# Patient Record
Sex: FEMALE | HISPANIC OR LATINO | URBAN - NONMETROPOLITAN AREA
[De-identification: names, ages, dates, MRNs, and addresses within clinical notes are randomized per-mention and may not be internally consistent; named-entity substitution may affect disease eponyms.]

---

## 2020-08-05 ENCOUNTER — OFFICE VISIT (OUTPATIENT)
Dept: URBAN - NONMETROPOLITAN AREA CLINIC 18 | Facility: CLINIC | Age: 60
End: 2020-08-05
Payer: COMMERCIAL

## 2020-08-05 DIAGNOSIS — H01.8 OTHER SPECIFIED INFLAMMATIONS OF EYELID: ICD-10-CM

## 2020-08-05 DIAGNOSIS — H52.03 HYPERMETROPIA, BILATERAL: ICD-10-CM

## 2020-08-05 DIAGNOSIS — H25.13 AGE-RELATED NUCLEAR CATARACT, BILATERAL: Primary | ICD-10-CM

## 2020-08-05 DIAGNOSIS — H43.21 CRYSTALLINE DEPOSITS IN VITREOUS BODY, RIGHT EYE: ICD-10-CM

## 2020-08-05 PROCEDURE — 92004 COMPRE OPH EXAM NEW PT 1/>: CPT | Performed by: OPTOMETRIST

## 2020-08-05 PROCEDURE — 99204 OFFICE O/P NEW MOD 45 MIN: CPT | Performed by: OPTOMETRIST

## 2020-08-05 ASSESSMENT — KERATOMETRY
OS: 44.75
OD: 45.00

## 2020-08-05 ASSESSMENT — INTRAOCULAR PRESSURE
OS: 18
OD: 18

## 2020-08-05 ASSESSMENT — VISUAL ACUITY
OD: 20/25
OS: 20/20

## 2020-08-05 NOTE — IMPRESSION/PLAN
Impression: Crystalline deposits in vitreous body, right eye: H43.21. Plan: All signs/symptoms and risks of retinal detachment and tears were discussed in detail. Patient instructed to call office immediately if any symptoms noted.

## 2022-09-07 ENCOUNTER — OFFICE VISIT (OUTPATIENT)
Dept: URBAN - NONMETROPOLITAN AREA CLINIC 18 | Facility: CLINIC | Age: 62
End: 2022-09-07
Payer: MEDICARE

## 2022-09-07 DIAGNOSIS — H43.21 CRYSTALLINE DEPOSITS IN VITREOUS BODY, RIGHT EYE: ICD-10-CM

## 2022-09-07 DIAGNOSIS — H18.522 EPITHELIAL (JUVENILE) CORNEAL DYSTROPHY, LEFT EYE: ICD-10-CM

## 2022-09-07 DIAGNOSIS — H25.811 COMBINED FORMS OF AGE-RELATED CATARACT, RIGHT EYE: Primary | ICD-10-CM

## 2022-09-07 DIAGNOSIS — H25.12 AGE-RELATED NUCLEAR CATARACT, LEFT EYE: ICD-10-CM

## 2022-09-07 DIAGNOSIS — H04.123 DRY EYE SYNDROME OF BILATERAL LACRIMAL GLANDS: ICD-10-CM

## 2022-09-07 PROCEDURE — 99213 OFFICE O/P EST LOW 20 MIN: CPT | Performed by: OPTOMETRIST

## 2022-09-07 ASSESSMENT — VISUAL ACUITY
OS: 20/20
OD: 20/30

## 2022-09-07 ASSESSMENT — INTRAOCULAR PRESSURE
OD: 18
OS: 18

## 2022-09-07 NOTE — IMPRESSION/PLAN
Impression: Crystalline deposits in vitreous body, right eye: H43.21. Plan: Discussed status. All signs/symptoms and risks of retinal detachment and tears were discussed in detail. Patient instructed to call office immediately if any symptoms noted.

## 2022-09-07 NOTE — IMPRESSION/PLAN
Impression: Combined forms of age-related cataract, right eye: H25.811. Plan: Reviewed ocular status and cataract with patient. Consider CE if no improvement in vision after ocular surface treatment. Patient will monitor for any decrease in vision and notify clinic if new symptoms experienced.

## 2022-11-10 ENCOUNTER — OFFICE VISIT (OUTPATIENT)
Dept: URBAN - NONMETROPOLITAN AREA CLINIC 18 | Facility: CLINIC | Age: 62
End: 2022-11-10
Payer: MEDICARE

## 2022-11-10 DIAGNOSIS — H52.03 HYPERMETROPIA, BILATERAL: ICD-10-CM

## 2022-11-10 DIAGNOSIS — H04.123 DRY EYE SYNDROME OF BILATERAL LACRIMAL GLANDS: Primary | ICD-10-CM

## 2022-11-10 DIAGNOSIS — H47.293 OTHER OPTIC ATROPHY, BILATERAL: ICD-10-CM

## 2022-11-10 DIAGNOSIS — H25.12 AGE-RELATED NUCLEAR CATARACT, LEFT EYE: ICD-10-CM

## 2022-11-10 DIAGNOSIS — H25.811 COMBINED FORMS OF AGE-RELATED CATARACT, RIGHT EYE: ICD-10-CM

## 2022-11-10 PROCEDURE — 99213 OFFICE O/P EST LOW 20 MIN: CPT | Performed by: OPTOMETRIST

## 2022-11-10 PROCEDURE — 92133 CPTRZD OPH DX IMG PST SGM ON: CPT | Performed by: OPTOMETRIST

## 2022-11-10 ASSESSMENT — INTRAOCULAR PRESSURE
OS: 19
OD: 21

## 2022-11-10 ASSESSMENT — VISUAL ACUITY
OD: 20/25
OS: 20/25

## 2022-11-10 NOTE — IMPRESSION/PLAN
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 Plan: Discussed status with patient. OCT ON today, borderline thin OU S/P papilledema OU. OCT macula today, thin OU. H/O treatment with lumbar puncture and had diagnosis for Lupus. Potential mild atrophy of Optic nerve from prior history, will monitor for stability. Optic nerve margins appear normal at this time with no new symptoms. Will monitor in 6 months with OCT ON/VF and follow up.

## 2022-11-10 NOTE — IMPRESSION/PLAN
Impression: Dry eye syndrome of bilateral lacrimal glands: H04.123. Plan: Discussed condition with patient in detail. Improved symptoms after artificial tears. Recommend treatment with Preservative Free Artificial tears QID. RTC if symptoms continue.

## 2023-07-25 ENCOUNTER — OFFICE VISIT (OUTPATIENT)
Dept: URBAN - NONMETROPOLITAN AREA CLINIC 18 | Facility: CLINIC | Age: 63
End: 2023-07-25
Payer: MEDICARE

## 2023-07-25 DIAGNOSIS — H25.12 AGE-RELATED NUCLEAR CATARACT, LEFT EYE: ICD-10-CM

## 2023-07-25 DIAGNOSIS — H25.811 COMBINED FORMS OF AGE-RELATED CATARACT, RIGHT EYE: ICD-10-CM

## 2023-07-25 DIAGNOSIS — H47.293 OTHER OPTIC ATROPHY, BILATERAL: Primary | ICD-10-CM

## 2023-07-25 PROCEDURE — 99213 OFFICE O/P EST LOW 20 MIN: CPT | Performed by: OPTOMETRIST

## 2023-07-25 PROCEDURE — 92133 CPTRZD OPH DX IMG PST SGM ON: CPT | Performed by: OPTOMETRIST

## 2023-07-25 PROCEDURE — 92134 CPTRZ OPH DX IMG PST SGM RTA: CPT | Performed by: OPTOMETRIST

## 2023-07-25 ASSESSMENT — INTRAOCULAR PRESSURE
OD: 14
OS: 14

## 2024-12-18 ENCOUNTER — OFFICE VISIT (OUTPATIENT)
Dept: URBAN - NONMETROPOLITAN AREA CLINIC 18 | Facility: CLINIC | Age: 64
End: 2024-12-18
Payer: MEDICARE

## 2024-12-18 DIAGNOSIS — H04.123 DRY EYE SYNDROME OF BILATERAL LACRIMAL GLANDS: ICD-10-CM

## 2024-12-18 DIAGNOSIS — H25.813 COMBINED FORMS OF AGE-RELATED CATARACT, BILATERAL: Primary | ICD-10-CM

## 2024-12-18 DIAGNOSIS — H43.21 CRYSTALLINE DEPOSITS IN VITREOUS BODY, RIGHT EYE: ICD-10-CM

## 2024-12-18 DIAGNOSIS — H47.293 OTHER OPTIC ATROPHY, BILATERAL: ICD-10-CM

## 2024-12-18 DIAGNOSIS — H43.813 VITREOUS DEGENERATION, BILATERAL: ICD-10-CM

## 2024-12-18 PROCEDURE — 99214 OFFICE O/P EST MOD 30 MIN: CPT | Performed by: OPTOMETRIST

## 2024-12-18 PROCEDURE — 92133 CPTRZD OPH DX IMG PST SGM ON: CPT | Performed by: OPTOMETRIST

## 2024-12-18 ASSESSMENT — INTRAOCULAR PRESSURE
OS: 19
OD: 20

## 2024-12-18 ASSESSMENT — KERATOMETRY
OD: 45.00
OS: 44.88

## 2025-01-21 ENCOUNTER — SURGERY (OUTPATIENT)
Dept: URBAN - METROPOLITAN AREA SURGERY 56 | Facility: SURGERY | Age: 65
End: 2025-01-21
Payer: COMMERCIAL

## 2025-01-21 PROCEDURE — 66984 XCAPSL CTRC RMVL W/O ECP: CPT | Performed by: OPHTHALMOLOGY

## 2025-01-22 ENCOUNTER — POST-OPERATIVE VISIT (OUTPATIENT)
Dept: URBAN - NONMETROPOLITAN AREA CLINIC 18 | Facility: CLINIC | Age: 65
End: 2025-01-22
Payer: COMMERCIAL

## 2025-01-22 DIAGNOSIS — Z48.810 ENCOUNTER FOR SURGICAL AFTERCARE FOLLOWING SURGERY ON A SENSE ORGAN: Primary | ICD-10-CM

## 2025-01-22 PROCEDURE — 99024 POSTOP FOLLOW-UP VISIT: CPT | Performed by: OPTOMETRIST

## 2025-01-22 ASSESSMENT — INTRAOCULAR PRESSURE: OD: 17

## 2025-01-28 ENCOUNTER — SURGERY (OUTPATIENT)
Dept: URBAN - METROPOLITAN AREA SURGERY 56 | Facility: SURGERY | Age: 65
End: 2025-01-28
Payer: COMMERCIAL

## 2025-01-28 PROCEDURE — 66984 XCAPSL CTRC RMVL W/O ECP: CPT | Performed by: OPHTHALMOLOGY

## 2025-01-29 ENCOUNTER — POST-OPERATIVE VISIT (OUTPATIENT)
Dept: URBAN - NONMETROPOLITAN AREA CLINIC 18 | Facility: CLINIC | Age: 65
End: 2025-01-29
Payer: COMMERCIAL

## 2025-01-29 DIAGNOSIS — Z96.1 PRESENCE OF INTRAOCULAR LENS: ICD-10-CM

## 2025-01-29 DIAGNOSIS — H52.03 HYPERMETROPIA, BILATERAL: Primary | ICD-10-CM

## 2025-01-29 PROCEDURE — 99024 POSTOP FOLLOW-UP VISIT: CPT | Performed by: OPTOMETRIST

## 2025-01-29 ASSESSMENT — INTRAOCULAR PRESSURE
OS: 13
OD: 11

## 2025-01-29 ASSESSMENT — VISUAL ACUITY: OD: 20/20

## 2025-02-05 ENCOUNTER — POST-OPERATIVE VISIT (OUTPATIENT)
Dept: URBAN - NONMETROPOLITAN AREA CLINIC 18 | Facility: CLINIC | Age: 65
End: 2025-02-05
Payer: COMMERCIAL

## 2025-02-05 DIAGNOSIS — H52.03 HYPERMETROPIA, BILATERAL: ICD-10-CM

## 2025-02-05 DIAGNOSIS — Z96.1 PRESENCE OF INTRAOCULAR LENS: Primary | ICD-10-CM

## 2025-02-05 PROCEDURE — 99024 POSTOP FOLLOW-UP VISIT: CPT | Performed by: OPTOMETRIST

## 2025-02-05 ASSESSMENT — INTRAOCULAR PRESSURE
OS: 9
OD: 15

## 2025-02-05 ASSESSMENT — VISUAL ACUITY
OS: 20/20
OD: 20/20

## 2025-02-26 ENCOUNTER — POST-OPERATIVE VISIT (OUTPATIENT)
Dept: URBAN - NONMETROPOLITAN AREA CLINIC 18 | Facility: CLINIC | Age: 65
End: 2025-02-26
Payer: COMMERCIAL

## 2025-02-26 DIAGNOSIS — Z96.1 PRESENCE OF INTRAOCULAR LENS: Primary | ICD-10-CM

## 2025-02-26 PROCEDURE — 99024 POSTOP FOLLOW-UP VISIT: CPT | Performed by: OPTOMETRIST

## 2025-02-26 ASSESSMENT — INTRAOCULAR PRESSURE
OS: 10
OD: 12